# Patient Record
Sex: MALE | NOT HISPANIC OR LATINO | Employment: FULL TIME | URBAN - METROPOLITAN AREA
[De-identification: names, ages, dates, MRNs, and addresses within clinical notes are randomized per-mention and may not be internally consistent; named-entity substitution may affect disease eponyms.]

---

## 2018-04-09 ENCOUNTER — OFFICE VISIT (OUTPATIENT)
Dept: FAMILY MEDICINE CLINIC | Facility: CLINIC | Age: 63
End: 2018-04-09
Payer: COMMERCIAL

## 2018-04-09 VITALS
HEART RATE: 60 BPM | DIASTOLIC BLOOD PRESSURE: 82 MMHG | HEIGHT: 69 IN | WEIGHT: 183.8 LBS | BODY MASS INDEX: 27.22 KG/M2 | SYSTOLIC BLOOD PRESSURE: 160 MMHG | RESPIRATION RATE: 16 BRPM

## 2018-04-09 DIAGNOSIS — E78.1 HYPERTRIGLYCERIDEMIA: Chronic | ICD-10-CM

## 2018-04-09 DIAGNOSIS — I10 ESSENTIAL HYPERTENSION: Chronic | ICD-10-CM

## 2018-04-09 DIAGNOSIS — Z12.5 SCREENING FOR PROSTATE CANCER: ICD-10-CM

## 2018-04-09 DIAGNOSIS — Z00.00 WELLNESS EXAMINATION: Primary | ICD-10-CM

## 2018-04-09 DIAGNOSIS — Z13.1 SCREENING FOR DIABETES MELLITUS: ICD-10-CM

## 2018-04-09 DIAGNOSIS — F41.9 ANXIETY: Chronic | ICD-10-CM

## 2018-04-09 DIAGNOSIS — I34.1 MVP (MITRAL VALVE PROLAPSE): ICD-10-CM

## 2018-04-09 PROCEDURE — 99386 PREV VISIT NEW AGE 40-64: CPT | Performed by: FAMILY MEDICINE

## 2018-04-09 RX ORDER — LISINOPRIL 20 MG/1
20 TABLET ORAL DAILY
Qty: 90 TABLET | Refills: 1 | Status: SHIPPED | OUTPATIENT
Start: 2018-04-09 | End: 2018-04-17 | Stop reason: SDUPTHER

## 2018-04-09 RX ORDER — LISINOPRIL 10 MG/1
10 TABLET ORAL DAILY
COMMUNITY
End: 2018-04-09 | Stop reason: DRUGHIGH

## 2018-04-09 RX ORDER — FENOFIBRATE 134 MG/1
134 CAPSULE ORAL
Qty: 90 CAPSULE | Refills: 1 | Status: SHIPPED | OUTPATIENT
Start: 2018-04-09 | End: 2018-08-17 | Stop reason: SDUPTHER

## 2018-04-09 RX ORDER — CITALOPRAM 10 MG/1
10 TABLET ORAL DAILY
Qty: 90 TABLET | Refills: 1 | Status: SHIPPED | OUTPATIENT
Start: 2018-04-09 | End: 2018-09-12 | Stop reason: ALTCHOICE

## 2018-04-09 RX ORDER — CITALOPRAM 10 MG/1
10 TABLET ORAL DAILY
COMMUNITY
End: 2018-04-09 | Stop reason: SDUPTHER

## 2018-04-09 RX ORDER — FENOFIBRATE 134 MG/1
134 CAPSULE ORAL
COMMUNITY
End: 2018-04-09 | Stop reason: SDUPTHER

## 2018-04-09 NOTE — PROGRESS NOTES
Assessment/Plan:    No problem-specific Assessment & Plan notes found for this encounter  Diagnoses and all orders for this visit:    Wellness examination  Comments:  FBW ordered for the pt  Continue oral appliance at night  FBW ordered  He is to get back to regular exercise  Screening for diabetes mellitus  -     CBC and differential; Future  -     Lipid panel; Future  -     Comprehensive metabolic panel; Future    Screening for prostate cancer  Comments:  PSA included with FBW  Orders:  -     PSA, Total Screen    Essential hypertension  Comments:  Increasing Lisinopril to 20mg QD  Pt to continue a lower salt diet, and get back to regular exercise  Orders:  -     lisinopril (ZESTRIL) 20 mg tablet; Take 1 tablet (20 mg total) by mouth daily for 90 days    Hypertriglyceridemia  Comments: On Fenofibrate  Orders:  -     fenofibrate micronized (LOFIBRA) 134 MG capsule; Take 1 capsule (134 mg total) by mouth daily with breakfast    Anxiety  Comments:  Stable on Celexa  Orders:  -     citalopram (CeleXA) 10 mg tablet; Take 1 tablet (10 mg total) by mouth daily    MVP (mitral valve prolapse)  Comments:  Unclear hx of - will check echocardiagram   Orders:  -     Echo complete with contrast if indicated; Future    Other orders  -     Discontinue: citalopram (CeleXA) 10 mg tablet; Take 10 mg by mouth daily  -     Discontinue: lisinopril (ZESTRIL) 10 mg tablet; Take 10 mg by mouth daily  -     Discontinue: fenofibrate micronized (LOFIBRA) 134 MG capsule; Take 134 mg by mouth every morning before breakfast          Subjective:      Patient ID: Liliana Hillman is a 58 y o  male  Had colonoscopy x 2 (polyps) - will be following up with GI  Sees a dentist regularly  Does not exercise regularly  Has 1 step-daughter  He presents with his wife today - an RN with [employer]  Works in the [employer]  Unclear hx of possible MVP in the past   Takes his medication regularly, and limits salt intake    Does not regularly check his BPs at home  Sees a Dermatologist twice annually  Has an oral appliance that he wears at night for hx of mild-mod YUAN  May be getting a new one possibly  The following portions of the patient's history were reviewed and updated as appropriate: allergies, current medications, past family history, past social history, past surgical history and problem list     Past Medical History:   Diagnosis Date    Anxiety     Hypertension      Past Surgical History:   Procedure Laterality Date   [facility] 40       Current Outpatient Prescriptions:     citalopram (CeleXA) 10 mg tablet, Take 1 tablet (10 mg total) by mouth daily, Disp: 90 tablet, Rfl: 1    fenofibrate micronized (LOFIBRA) 134 MG capsule, Take 1 capsule (134 mg total) by mouth daily with breakfast, Disp: 90 capsule, Rfl: 1    lisinopril (ZESTRIL) 20 mg tablet, Take 1 tablet (20 mg total) by mouth daily for 90 days, Disp: 90 tablet, Rfl: 1    No Known Allergies    Family History   Problem Relation Age of Onset    Lung cancer Mother      No Known Allergies    Social History   Substance Use Topics    Smoking status: Never Smoker    Smokeless tobacco: Never Used    Alcohol use Yes      Comment: social         Review of Systems   Constitutional: Negative for activity change  Some head fullness - no dizziness; possibly assoc with his higher BPs  Respiratory: Negative for shortness of breath  Cardiovascular: Negative for chest pain  Gastrointestinal: Negative for abdominal pain and blood in stool  Genitourinary: Negative for difficulty urinating  No ED  Psychiatric/Behavioral: Negative for dysphoric mood  The patient is nervous/anxious  Chronic hx of anxiety - much better with treatment           Objective:      /82 (BP Location: Right arm, Patient Position: Sitting, Cuff Size: Standard)   Pulse 60   Resp 16   Ht 5' 9 09" (1 755 m)   Wt 83 4 kg (183 lb 12 8 oz)   BMI 27 07 kg/m²          Physical Exam   Constitutional: He is oriented to person, place, and time  He appears well-developed and well-nourished  No distress  HENT:   Head: Normocephalic and atraumatic  Right Ear: Hearing, tympanic membrane, external ear and ear canal normal    Left Ear: Hearing, tympanic membrane, external ear and ear canal normal    Mouth/Throat: Oropharynx is clear and moist  No oropharyngeal exudate  Eyes: Conjunctivae are normal    Neck: Normal range of motion  Neck supple  No thyromegaly present  Cardiovascular: Normal rate, regular rhythm, normal heart sounds and intact distal pulses  Exam reveals no gallop and no friction rub  No murmur heard  Pulmonary/Chest: Effort normal and breath sounds normal  No respiratory distress  He has no wheezes  He has no rales  Abdominal: Soft  Bowel sounds are normal  He exhibits no distension and no mass  There is no tenderness  There is no rebound and no guarding  Genitourinary: Prostate normal and penis normal  Rectal exam shows external hemorrhoid  Genitourinary Comments: 1 Non-thrombosed, non-irritated, external hemorrhoid  Musculoskeletal: He exhibits no edema  Lymphadenopathy:     He has no cervical adenopathy  Neurological: He is alert and oriented to person, place, and time  Skin: He is not diaphoretic  Psychiatric: He has a normal mood and affect  His behavior is normal  Judgment and thought content normal    Nursing note and vitals reviewed

## 2018-04-09 NOTE — PATIENT INSTRUCTIONS
Thank you for enrolling in [facility]  Please follow the instructions below to securely access your online medical record  Conjurt allows you to send messages to your doctor, view your test results, renew your prescriptions, schedule appointments, and more  [address] uses Single Sign on (SSO) Technology for you to log in and access our [facility], including [facility]  No more remembering multiple user names and passwords! We are going to guide you through, step by step, to help you set up your [employer] account which will provide access to your Con[employer]urt account  How Do I Sign Up? 1  In your Internet browser, go to [email or web address]       2  Click on the   [employer] patient account and then click Dont have an                 Account? Create one now      3  Enter your demographic information and chose a user name (email address) and password  Think of one that is secure and easy to remember  Enter a Referral code if you have one (this is not your "[employer]hart code ) Accept the Terms and Conditions and the Privacy Policy  4  Select your security questions that you will use to reset your password should you forget it  Click Submit  5  Enter your Conjurt Activation Code exactly as it appears below  You will not need to use this code after you have completed the sign-up process  If you do not sign up before the expiration date, you must request a new code  [facility] Activation Code: [email or web address]  Expires: 4/12/2018  6:23 PM    6  Confirm your email address  An email confirmation was sent to you  Please open that email and click Confirm your Email   You should then be redirected to our [employer] [facility] Single sign on page, where you will log on with the user name and password you created! Proceed to the [facility] Icon to view your personal health information          Additional Information  If you have questions, you can e-mail patient  [email or web address] or call [phone] to talk to our customer support staff  Remember, MyChart is NOT to be used for urgent needs  For medical emergencies, dial 911

## 2018-04-17 ENCOUNTER — TELEPHONE (OUTPATIENT)
Dept: FAMILY MEDICINE CLINIC | Facility: CLINIC | Age: 63
End: 2018-04-17

## 2018-04-17 DIAGNOSIS — F41.9 ANXIETY: Chronic | ICD-10-CM

## 2018-04-17 DIAGNOSIS — I10 ESSENTIAL HYPERTENSION: Primary | ICD-10-CM

## 2018-04-17 DIAGNOSIS — I10 ESSENTIAL HYPERTENSION: ICD-10-CM

## 2018-04-17 RX ORDER — CITALOPRAM 10 MG/1
10 TABLET ORAL DAILY
Qty: 90 TABLET | Refills: 0 | OUTPATIENT
Start: 2018-04-17

## 2018-04-17 RX ORDER — LISINOPRIL 40 MG/1
40 TABLET ORAL DAILY
Qty: 90 TABLET | Refills: 3 | Status: SHIPPED | OUTPATIENT
Start: 2018-04-17 | End: 2018-09-12 | Stop reason: ALTCHOICE

## 2018-04-17 RX ORDER — LISINOPRIL 40 MG/1
40 TABLET ORAL DAILY
Qty: 90 TABLET | Refills: 0 | OUTPATIENT
Start: 2018-04-17

## 2018-04-17 NOTE — TELEPHONE ENCOUNTER
I sent it to [facility]  We had him as taking 20 mg in the chart  I changed it   F/u with his PCP as scheduled

## 2018-04-30 LAB
ALBUMIN SERPL-MCNC: 4.6 G/DL (ref 3.6–4.8)
ALBUMIN/GLOB SERPL: 2.4 {RATIO} (ref 1.2–2.2)
ALP SERPL-CCNC: 55 IU/L (ref 39–117)
ALT SERPL-CCNC: 40 IU/L (ref 0–44)
AMBIG ABBREV DEFAULT: NORMAL
AMBIG ABBREV DEFAULT: NORMAL
AST SERPL-CCNC: 24 IU/L (ref 0–40)
BASOPHILS # BLD AUTO: 0 X10E3/UL (ref 0–0.2)
BASOPHILS NFR BLD AUTO: 1 %
BILIRUB SERPL-MCNC: 0.5 MG/DL (ref 0–1.2)
BUN SERPL-MCNC: 20 MG/DL (ref 8–27)
BUN/CREAT SERPL: 17 (ref 10–24)
CALCIUM SERPL-MCNC: 9.6 MG/DL (ref 8.6–10.2)
CHLORIDE SERPL-SCNC: 106 MMOL/L (ref 96–106)
CHOLEST SERPL-MCNC: 157 MG/DL (ref 100–199)
CO2 SERPL-SCNC: 24 MMOL/L (ref 18–29)
CREAT SERPL-MCNC: 1.19 MG/DL (ref 0.76–1.27)
EOSINOPHIL # BLD AUTO: 0.1 X10E3/UL (ref 0–0.4)
EOSINOPHIL NFR BLD AUTO: 3 %
ERYTHROCYTE [DISTWIDTH] IN BLOOD BY AUTOMATED COUNT: 13.5 % (ref 12.3–15.4)
GLOBULIN SER-MCNC: 1.9 G/DL (ref 1.5–4.5)
GLUCOSE SERPL-MCNC: 112 MG/DL (ref 65–99)
HCT VFR BLD AUTO: 43 % (ref 37.5–51)
HDLC SERPL-MCNC: 36 MG/DL
HGB BLD-MCNC: 14.1 G/DL (ref 13–17.7)
IMM GRANULOCYTES # BLD: 0 X10E3/UL (ref 0–0.1)
IMM GRANULOCYTES NFR BLD: 0 %
LDLC SERPL CALC-MCNC: 99 MG/DL (ref 0–99)
LYMPHOCYTES # BLD AUTO: 2 X10E3/UL (ref 0.7–3.1)
LYMPHOCYTES NFR BLD AUTO: 43 %
MCH RBC QN AUTO: 31.6 PG (ref 26.6–33)
MCHC RBC AUTO-ENTMCNC: 32.8 G/DL (ref 31.5–35.7)
MCV RBC AUTO: 96 FL (ref 79–97)
MONOCYTES # BLD AUTO: 0.3 X10E3/UL (ref 0.1–0.9)
MONOCYTES NFR BLD AUTO: 6 %
NEUTROPHILS # BLD AUTO: 2.2 X10E3/UL (ref 1.4–7)
NEUTROPHILS NFR BLD AUTO: 47 %
PLATELET # BLD AUTO: 222 X10E3/UL (ref 150–379)
POTASSIUM SERPL-SCNC: 5 MMOL/L (ref 3.5–5.2)
PROT SERPL-MCNC: 6.5 G/DL (ref 6–8.5)
PSA SERPL-MCNC: 0.2 NG/ML (ref 0–4)
RBC # BLD AUTO: 4.46 X10E6/UL (ref 4.14–5.8)
SL AMB EGFR AFRICAN AMERICAN: 75 ML/MIN/1.73
SL AMB EGFR NON AFRICAN AMERICAN: 65 ML/MIN/1.73
SL AMB VLDL CHOLESTEROL CALC: 22 MG/DL (ref 5–40)
SODIUM SERPL-SCNC: 145 MMOL/L (ref 134–144)
TRIGL SERPL-MCNC: 111 MG/DL (ref 0–149)
WBC # BLD AUTO: 4.7 X10E3/UL (ref 3.4–10.8)

## 2018-05-01 NOTE — PROGRESS NOTES
Please let the patient know that their bloodwork was generally normal - his cholesterol is well-controlled, blood counts normal, LFTs normal, PSA (prostate) normal at 0 2  His fasting glucose was a little high at 112 though (should be under 100)  Recommend working on a lower carb / starch diet, and getting regular exercise  He is to f/u as planned  Thanks     Dr Danielle Meyer

## 2018-05-10 ENCOUNTER — HOSPITAL ENCOUNTER (OUTPATIENT)
Dept: NON INVASIVE DIAGNOSTICS | Facility: CLINIC | Age: 63
Discharge: HOME | End: 2018-05-10
Payer: COMMERCIAL

## 2018-05-10 DIAGNOSIS — I34.1 MVP (MITRAL VALVE PROLAPSE): ICD-10-CM

## 2018-05-10 PROCEDURE — 93306 TTE W/DOPPLER COMPLETE: CPT

## 2018-05-11 PROCEDURE — 93306 TTE W/DOPPLER COMPLETE: CPT | Performed by: INTERNAL MEDICINE

## 2018-05-11 NOTE — PROGRESS NOTES
Please let the patient know that their echocardiogram was some reassuring - some trace leakiness at valves, but this is not an uncommon finding on this study  Overall heart function appears normal, and there is no clear sign of mitral valve prolapse  Thanks  Annabella

## 2018-07-23 ENCOUNTER — OFFICE VISIT (OUTPATIENT)
Dept: FAMILY MEDICINE CLINIC | Facility: CLINIC | Age: 63
End: 2018-07-23
Payer: COMMERCIAL

## 2018-07-23 VITALS
SYSTOLIC BLOOD PRESSURE: 122 MMHG | DIASTOLIC BLOOD PRESSURE: 76 MMHG | BODY MASS INDEX: 26.42 KG/M2 | WEIGHT: 179.4 LBS | RESPIRATION RATE: 16 BRPM | HEART RATE: 80 BPM

## 2018-07-23 DIAGNOSIS — I10 ESSENTIAL HYPERTENSION: Chronic | ICD-10-CM

## 2018-07-23 DIAGNOSIS — R73.01 IMPAIRED FASTING GLUCOSE: ICD-10-CM

## 2018-07-23 DIAGNOSIS — E78.1 HYPERTRIGLYCERIDEMIA: Primary | Chronic | ICD-10-CM

## 2018-07-23 DIAGNOSIS — F41.9 ANXIETY: Chronic | ICD-10-CM

## 2018-07-23 PROCEDURE — 3074F SYST BP LT 130 MM HG: CPT | Performed by: FAMILY MEDICINE

## 2018-07-23 PROCEDURE — 3078F DIAST BP <80 MM HG: CPT | Performed by: FAMILY MEDICINE

## 2018-07-23 PROCEDURE — 99213 OFFICE O/P EST LOW 20 MIN: CPT | Performed by: FAMILY MEDICINE

## 2018-07-23 NOTE — PROGRESS NOTES
Assessment/Plan:    No problem-specific Assessment & Plan notes found for this encounter  Diagnoses and all orders for this visit:    Hypertriglyceridemia  Comments: Well controlled with Fenofibrate  He is to continue a lower carb diet, and try to get regular exercise  Essential hypertension  Comments:  Controlled well with Lisinopril 20 MG (reviewed) daily  Impaired fasting glucose  Comments:  Diet and exercise as above  We will review labs when available from 11/2018  Anxiety  Comments:  Controlled on present management of Celexa 10mg QD  Subjective:      Patient ID: Barbie Mosqueda is a 58 y o  male  Pt presents with his wife today in f/u  Echo reviewed - no signs of MVP, some mild regurg at valves but no stenosis, normal LVF, EF 60%, no LVH  Labs reassuring - PSA 0 2, Trig 111, HDL 37, LDL 99, CBC nml, Cr/LFTs nml  Glucose noted though at 112  Generally feeling well  He will be having labs done for insurance with work in 11/2018 - we discussed, and suspect that there will be an A1c with these labs  The following portions of the patient's history were reviewed and updated as appropriate: allergies, current medications, past family history, past medical history, past surgical history and problem list     Review of Systems   Constitutional: Negative for activity change  Respiratory: Negative for shortness of breath  Cardiovascular: Negative for chest pain  Musculoskeletal: Negative for myalgias  Psychiatric/Behavioral:        Mood is stable on present therapy by pt report  Objective:      /76 (BP Location: Left arm, Patient Position: Sitting, Cuff Size: Standard)   Pulse 80   Resp 16   Wt 81 4 kg (179 lb 6 4 oz)   BMI 26 42 kg/m²          Physical Exam   Constitutional: He is oriented to person, place, and time  He appears well-developed and well-nourished  No distress  HENT:   Head: Normocephalic and atraumatic     Eyes: Conjunctivae are normal  Neck: Normal range of motion  Neck supple  No thyromegaly present  Cardiovascular: Normal rate, regular rhythm and normal heart sounds  Exam reveals no gallop and no friction rub  No murmur heard  Pulmonary/Chest: Effort normal and breath sounds normal  No respiratory distress  He has no wheezes  He has no rales  Lymphadenopathy:     He has no cervical adenopathy  Neurological: He is alert and oriented to person, place, and time  Skin: He is not diaphoretic  Psychiatric: He has a normal mood and affect  His behavior is normal  Judgment and thought content normal    Nursing note and vitals reviewed

## 2018-08-17 DIAGNOSIS — E78.1 HYPERTRIGLYCERIDEMIA: Chronic | ICD-10-CM

## 2018-08-17 RX ORDER — FENOFIBRATE 134 MG/1
CAPSULE ORAL
Qty: 90 CAPSULE | Refills: 1 | Status: SHIPPED | OUTPATIENT
Start: 2018-08-17 | End: 2019-03-03 | Stop reason: SDUPTHER

## 2018-09-12 ENCOUNTER — OFFICE VISIT (OUTPATIENT)
Dept: FAMILY MEDICINE CLINIC | Facility: CLINIC | Age: 63
End: 2018-09-12
Payer: COMMERCIAL

## 2018-09-12 VITALS
OXYGEN SATURATION: 97 % | HEART RATE: 58 BPM | DIASTOLIC BLOOD PRESSURE: 82 MMHG | WEIGHT: 183.4 LBS | SYSTOLIC BLOOD PRESSURE: 124 MMHG | BODY MASS INDEX: 27.01 KG/M2

## 2018-09-12 DIAGNOSIS — H91.93 BILATERAL HEARING LOSS, UNSPECIFIED HEARING LOSS TYPE: ICD-10-CM

## 2018-09-12 DIAGNOSIS — K62.5 RECTAL BLEEDING: Primary | ICD-10-CM

## 2018-09-12 PROCEDURE — 99213 OFFICE O/P EST LOW 20 MIN: CPT | Performed by: FAMILY MEDICINE

## 2018-09-12 RX ORDER — CITALOPRAM 40 MG/1
40 TABLET ORAL DAILY
COMMUNITY
End: 2019-01-16 | Stop reason: SDUPTHER

## 2018-09-12 RX ORDER — LISINOPRIL 20 MG/1
20 TABLET ORAL DAILY
COMMUNITY
End: 2019-03-14 | Stop reason: ALTCHOICE

## 2018-09-12 NOTE — PROGRESS NOTES
Assessment/Plan:    No problem-specific Assessment & Plan notes found for this encounter  Diagnoses and all orders for this visit:    Rectal bleeding  Comments:  Suspect here bleeding due to internal hemorrhoid, etc  I did agree with pt seeing GI - he is due at this time for colon cancer screening  Precautions given  Bilateral hearing loss, unspecified hearing loss type  Comments:  Pt's wife reports that Diane Pretty may be showing some signs of hearing difficulties - Consult placed to Audiology  Orders:  -     Ambulatory referral to Audiology; Future    Other orders  -     lisinopril (ZESTRIL) 20 mg tablet; Take 20 mg by mouth daily  -     citalopram (CeleXA) 40 mg tablet; Take 40 mg by mouth daily          Subjective:      Patient ID: Rashida Moran is a 58 y o  male  Diane Pretty presents with his wife today  Had some recurrent pain in the RLQ - last was approx 2 weeks ago; resolved  No diarrhea or constipation  Had some streaking of bright blood on the toilet tissue around that time as well  Also resolved  A darker, but NON-melanotic, stool today  He has already made an appt with [facility], and sees next week  He is due for his next colonoscopy  Of Note:  Pt declined today NICK/FOBT testing in clinic      The following portions of the patient's history were reviewed and updated as appropriate: allergies, current medications, past family history, past social history, past surgical history and problem list     Past Medical History:   Diagnosis Date    Anxiety     Hypertension        Current Outpatient Prescriptions:     citalopram (CeleXA) 40 mg tablet, Take 40 mg by mouth daily, Disp: , Rfl:     lisinopril (ZESTRIL) 20 mg tablet, Take 20 mg by mouth daily, Disp: , Rfl:     fenofibrate micronized (LOFIBRA) 134 MG capsule, TAKE 1 CAPSULE BY MOUTH  DAILY WITH BREAKFAST, Disp: 90 capsule, Rfl: 1    No Known Allergies    Social History   Substance Use Topics    Smoking status: Never Smoker    Smokeless tobacco: Never Used    Alcohol use Yes      Comment: social         Review of Systems   Constitutional: Negative for activity change and fever  HENT: Positive for hearing loss  Respiratory: Negative for shortness of breath  Cardiovascular: Negative for chest pain  Gastrointestinal: Negative for abdominal pain, blood in stool, constipation and diarrhea  Neurological: Negative for dizziness  Objective:      /82 (BP Location: Right arm, Patient Position: Sitting, Cuff Size: Standard)   Pulse 58   Wt 83 2 kg (183 lb 6 4 oz)   SpO2 97%   BMI 27 01 kg/m²          Physical Exam   Constitutional: He is oriented to person, place, and time  He appears well-developed and well-nourished  No distress  HENT:   Head: Normocephalic and atraumatic  Eyes: Conjunctivae are normal    Neck: Normal range of motion  Neck supple  No thyromegaly present  Cardiovascular: Normal rate, regular rhythm and normal heart sounds  Exam reveals no gallop and no friction rub  No murmur heard  Pulmonary/Chest: Effort normal and breath sounds normal  No respiratory distress  He has no wheezes  He has no rales  Abdominal: Soft  Bowel sounds are normal  He exhibits no distension and no mass  There is no tenderness  There is no rebound and no guarding  Lymphadenopathy:     He has no cervical adenopathy  Neurological: He is alert and oriented to person, place, and time  Skin: He is not diaphoretic  Psychiatric: His speech is normal and behavior is normal  Judgment and thought content normal  His mood appears anxious  Nursing note and vitals reviewed

## 2018-11-02 ENCOUNTER — OFFICE VISIT (OUTPATIENT)
Dept: FAMILY MEDICINE CLINIC | Facility: CLINIC | Age: 63
End: 2018-11-02
Payer: COMMERCIAL

## 2018-11-02 VITALS
TEMPERATURE: 97.2 F | RESPIRATION RATE: 16 BRPM | DIASTOLIC BLOOD PRESSURE: 80 MMHG | HEIGHT: 69 IN | SYSTOLIC BLOOD PRESSURE: 122 MMHG | HEART RATE: 54 BPM | OXYGEN SATURATION: 96 % | WEIGHT: 181.6 LBS | BODY MASS INDEX: 26.9 KG/M2

## 2018-11-02 DIAGNOSIS — A08.4 VIRAL GASTROENTERITIS: Primary | ICD-10-CM

## 2018-11-02 PROCEDURE — 3008F BODY MASS INDEX DOCD: CPT | Performed by: FAMILY MEDICINE

## 2018-11-02 PROCEDURE — 99213 OFFICE O/P EST LOW 20 MIN: CPT | Performed by: FAMILY MEDICINE

## 2018-11-02 NOTE — PROGRESS NOTES
Assessment/Plan:    No problem-specific Assessment & Plan notes found for this encounter  Diagnoses and all orders for this visit:    Viral gastroenteritis  Comments:  Mild VGE/pt stable on exam   Pt to follow a bland diet for a few days, & advance slowly; hydrate well  OTC Zantac x 3 - 4 days, Imodium PRN  Precautions  Subjective:      Patient ID: Mckay New is a 58 y o  male  Viviana Organ had issues with gas in the epigastrium today, followed later by nausea and loose stools  No fevers  No blood in the stool  The following portions of the patient's history were reviewed and updated as appropriate: allergies, current medications, past family history, past social history, past surgical history and problem list     Past Medical History:   Diagnosis Date    Anxiety     Hypertension      Past Surgical History:   Procedure Laterality Date   [address]       Current Outpatient Prescriptions:     citalopram (CeleXA) 40 mg tablet, Take 40 mg by mouth daily, Disp: , Rfl:     fenofibrate micronized (LOFIBRA) 134 MG capsule, TAKE 1 CAPSULE BY MOUTH  DAILY WITH BREAKFAST, Disp: 90 capsule, Rfl: 1    lisinopril (ZESTRIL) 20 mg tablet, Take 20 mg by mouth daily, Disp: , Rfl:     No Known Allergies    Review of Systems   Constitutional: Positive for fatigue  Negative for activity change and fever  Respiratory: Negative for shortness of breath  Cardiovascular: Negative for chest pain  Gastrointestinal: Positive for diarrhea and nausea  +Gassy  Objective:      /80 (BP Location: Left arm, Patient Position: Sitting, Cuff Size: Standard)   Pulse (!) 54   Temp (!) 97 2 °F (36 2 °C) (Tympanic)   Resp 16   Ht 5' 9" (1 753 m)   Wt 82 4 kg (181 lb 9 6 oz)   SpO2 96%   BMI 26 82 kg/m²          Physical Exam   Constitutional: He is oriented to person, place, and time  He appears well-developed and well-nourished   No distress  Pierre Patel is tired today, but non-toxic appearing  HENT:   Head: Normocephalic and atraumatic  Right Ear: Hearing, tympanic membrane, external ear and ear canal normal    Left Ear: Hearing, tympanic membrane, external ear and ear canal normal    Mouth/Throat: Oropharynx is clear and moist  No oropharyngeal exudate  Eyes: Conjunctivae are normal    Neck: Normal range of motion  Neck supple  No thyromegaly present  Cardiovascular: Normal rate, regular rhythm and normal heart sounds  Exam reveals no gallop and no friction rub  No murmur heard  Pulmonary/Chest: Effort normal and breath sounds normal  No respiratory distress  He has no wheezes  He has no rales  Abdominal: Soft  Bowel sounds are normal  He exhibits no ascites and no mass  There is no hepatosplenomegaly  There is tenderness in the epigastric area  There is no rigidity, no rebound, no guarding, no tenderness at McBurney's point and negative Enamorado's sign  Lymphadenopathy:     He has no cervical adenopathy  Neurological: He is alert and oriented to person, place, and time  Skin: He is not diaphoretic  Psychiatric: He has a normal mood and affect  His behavior is normal  Judgment and thought content normal    Nursing note and vitals reviewed

## 2018-11-19 ENCOUNTER — ANESTHESIA EVENT (OUTPATIENT)
Dept: PERIOP | Facility: HOSPITAL | Age: 63
End: 2018-11-19
Payer: COMMERCIAL

## 2018-11-21 ENCOUNTER — ANESTHESIA (OUTPATIENT)
Dept: PERIOP | Facility: HOSPITAL | Age: 63
End: 2018-11-21
Payer: COMMERCIAL

## 2018-11-21 ENCOUNTER — HOSPITAL ENCOUNTER (OUTPATIENT)
Facility: HOSPITAL | Age: 63
Setting detail: OUTPATIENT SURGERY
Discharge: HOME | End: 2018-11-21
Attending: INTERNAL MEDICINE | Admitting: INTERNAL MEDICINE
Payer: COMMERCIAL

## 2018-11-21 VITALS
OXYGEN SATURATION: 96 % | HEIGHT: 69 IN | TEMPERATURE: 97.9 F | WEIGHT: 180.2 LBS | SYSTOLIC BLOOD PRESSURE: 153 MMHG | HEART RATE: 55 BPM | BODY MASS INDEX: 26.69 KG/M2 | DIASTOLIC BLOOD PRESSURE: 94 MMHG | RESPIRATION RATE: 16 BRPM

## 2018-11-21 DIAGNOSIS — Z12.11 ENCOUNTER FOR SCREENING FOR MALIGNANT NEOPLASM OF COLON: ICD-10-CM

## 2018-11-21 PROCEDURE — 88305 TISSUE EXAM BY PATHOLOGIST: CPT | Performed by: PATHOLOGY

## 2018-11-21 RX ORDER — PROPOFOL 10 MG/ML
INJECTION, EMULSION INTRAVENOUS AS NEEDED
Status: DISCONTINUED | OUTPATIENT
Start: 2018-11-21 | End: 2018-11-21 | Stop reason: SURG

## 2018-11-21 RX ORDER — HYDRALAZINE HYDROCHLORIDE 20 MG/ML
5 INJECTION INTRAMUSCULAR; INTRAVENOUS AS NEEDED
Status: DISCONTINUED | OUTPATIENT
Start: 2018-11-21 | End: 2018-11-21 | Stop reason: HOSPADM

## 2018-11-21 RX ORDER — METOCLOPRAMIDE HYDROCHLORIDE 5 MG/ML
10 INJECTION INTRAMUSCULAR; INTRAVENOUS ONCE AS NEEDED
Status: DISCONTINUED | OUTPATIENT
Start: 2018-11-21 | End: 2018-11-21 | Stop reason: HOSPADM

## 2018-11-21 RX ORDER — FENTANYL CITRATE/PF 50 MCG/ML
25 SYRINGE (ML) INJECTION
Status: DISCONTINUED | OUTPATIENT
Start: 2018-11-21 | End: 2018-11-21 | Stop reason: HOSPADM

## 2018-11-21 RX ORDER — PROMETHAZINE HYDROCHLORIDE 25 MG/ML
6.25 INJECTION, SOLUTION INTRAMUSCULAR; INTRAVENOUS ONCE AS NEEDED
Status: DISCONTINUED | OUTPATIENT
Start: 2018-11-21 | End: 2018-11-21 | Stop reason: HOSPADM

## 2018-11-21 RX ORDER — ONDANSETRON 2 MG/ML
4 INJECTION INTRAMUSCULAR; INTRAVENOUS ONCE AS NEEDED
Status: DISCONTINUED | OUTPATIENT
Start: 2018-11-21 | End: 2018-11-21 | Stop reason: HOSPADM

## 2018-11-21 RX ORDER — SODIUM CHLORIDE 9 MG/ML
20 INJECTION, SOLUTION INTRAVENOUS CONTINUOUS
Status: DISCONTINUED | OUTPATIENT
Start: 2018-11-21 | End: 2018-11-21 | Stop reason: HOSPADM

## 2018-11-21 RX ORDER — MEPERIDINE HYDROCHLORIDE 50 MG/ML
12.5 INJECTION INTRAMUSCULAR; INTRAVENOUS; SUBCUTANEOUS
Status: DISCONTINUED | OUTPATIENT
Start: 2018-11-21 | End: 2018-11-21 | Stop reason: HOSPADM

## 2018-11-21 RX ORDER — LABETALOL HYDROCHLORIDE 5 MG/ML
5 INJECTION, SOLUTION INTRAVENOUS AS NEEDED
Status: DISCONTINUED | OUTPATIENT
Start: 2018-11-21 | End: 2018-11-21 | Stop reason: HOSPADM

## 2018-11-21 RX ADMIN — PROPOFOL 30 MG: 10 INJECTION, EMULSION INTRAVENOUS at 08:49

## 2018-11-21 RX ADMIN — PROPOFOL 20 MG: 10 INJECTION, EMULSION INTRAVENOUS at 08:51

## 2018-11-21 RX ADMIN — SODIUM CHLORIDE 20 ML/HR: 0.9 INJECTION, SOLUTION INTRAVENOUS at 07:54

## 2018-11-21 RX ADMIN — PROPOFOL 20 MG: 10 INJECTION, EMULSION INTRAVENOUS at 08:38

## 2018-11-21 RX ADMIN — PROPOFOL 50 MG: 10 INJECTION, EMULSION INTRAVENOUS at 08:44

## 2018-11-21 RX ADMIN — PROPOFOL 20 MG: 10 INJECTION, EMULSION INTRAVENOUS at 08:40

## 2018-11-21 RX ADMIN — PROPOFOL 30 MG: 10 INJECTION, EMULSION INTRAVENOUS at 08:34

## 2018-11-21 RX ADMIN — PROPOFOL 30 MG: 10 INJECTION, EMULSION INTRAVENOUS at 08:52

## 2018-11-21 RX ADMIN — PROPOFOL 20 MG: 10 INJECTION, EMULSION INTRAVENOUS at 08:33

## 2018-11-21 RX ADMIN — PROPOFOL 100 MG: 10 INJECTION, EMULSION INTRAVENOUS at 08:31

## 2018-11-21 RX ADMIN — PROPOFOL 50 MG: 10 INJECTION, EMULSION INTRAVENOUS at 08:41

## 2018-11-21 RX ADMIN — SODIUM CHLORIDE: 0.9 INJECTION, SOLUTION INTRAVENOUS at 08:00

## 2018-11-21 RX ADMIN — PROPOFOL 30 MG: 10 INJECTION, EMULSION INTRAVENOUS at 08:46

## 2018-11-21 RX ADMIN — PROPOFOL 30 MG: 10 INJECTION, EMULSION INTRAVENOUS at 08:35

## 2018-11-21 NOTE — ANESTHESIA POSTPROCEDURE EVALUATION
Post-Op Assessment Note      CV Status:  Stable    Mental Status:  Awake    Hydration Status:  Stable    PONV Controlled:  None    Airway Patency:  Patent and adequate    Post Op Vitals Reviewed: Yes          Staff: Anesthesiologist           BP      Temp      Pulse    Resp      SpO2

## 2018-11-21 NOTE — DISCHARGE INSTRUCTIONS
Jann Lundborg      Dear Patient,    If specimens were collected, the office will call you with the pathology results within 2 weeks  It is very important that you follow these instructions:    Because you received intravenous sedation for your exam, you must return home and   · REST TODAY - DO NOT STAY ALONE  · DO NOT operate machinery for 24 hours   · DO NOT drink alcohol for 24 hours  · DO NOT drive for 24 hours  · DO NOT return to work until tomorrow   · DO NOT sign important paperwork for 24 hours    ·  If the site where your IV was placed is painful, place warm wet  compresses on the site until the soreness is relieved  Call us if there  is no improvement within 24 hours  · Unless otherwise instructed, you may resume your regular diet,  Start by taking  crackers and clear liquids  If no nausea or vomiting, you may advance your diet as tolerated  · Call your physician at [phone] if you develop any of the following symptoms:  · NEW OR INCREASED BLEEDING  · NEW ABDOMINAL DISTENTION (SWELLING)  · INCREASING NAUSEA, VOMITING OR PAIN  · FEVER/CHILLS  · BLACK/BLOODY STOOLS    If you are unable to contact the the doctor or your primary physician and you are having a severe complication, go to the nearest emergency room or call 911  Call [facility] at [phone] if you have any problems or questions related to your procedure  NOTE: After normal office hours (Monday-Friday 9:00 a m  to 4:00 p m ), the answering service will answer the phone, take a message, and they will contact the on call physician to call you

## 2018-11-21 NOTE — OP NOTE
Colonoscopy Procedure Note    Procedure: Colonoscopy    Sedation: Monitored anesthesia care, check anesthesia records    II      ASA Class: II     INDICATIONS:  Screening    POST-OP DIAGNOSIS: See the impression below    Procedure Details     Prior colonoscopy: 10 years ago  Informed consent was obtained for the procedure, including sedation  Risks of perforation, hemorrhage, adverse drug reaction and aspiration were discussed  The patient was placed in the left lateral decubitus position  Based on the pre-procedure assessment, including review of the patient's medical history, medications, allergies, and review of systems, he had been deemed to be an appropriate candidate for conscious sedation; he was therefore sedated with the medications listed below  The patient was monitored continuously with telemetry, pulse oximetry, blood pressure monitoring, and direct observations  A rectal examination was performed  The colonoscope was inserted into the rectum and advanced under direct vision to the cecum, which was identified by the ileocecal valve and appendiceal orifice  The quality of the colonic preparation was good  A careful inspection was made as the colonoscope was withdrawn, including a retroflexed view of the rectum; findings and interventions are described below  Findings:  Small sessile polyps 4-5 mm 1 in ascending colon the other distal sigmoid both removed by cold biopsy forceps  Good hemostasis post polypectomy  Internal and external hemorrhoids  Estimated Blood Loss: None    Specimens: Pathology        Complications: None; patient tolerated the procedure well  Impression:    Small sessile polyps  Internal and external hemorrhoids    Recommendations:  Resume usual diet and medications  Repeat colonoscopy in 5 years if polyp is adenomatous, 10 years if hyperplastic or normal tissue

## 2018-11-21 NOTE — ANESTHESIA PREPROCEDURE EVALUATION
Review of Systems/Medical History  Patient summary reviewed  Chart reviewed      Cardiovascular  Exercise tolerance (METS): >4,  Hyperlipidemia, Hypertension ,    Pulmonary  Negative pulmonary ROS        GI/Hepatic  Negative GI/hepatic ROS   Bowel prep       Negative  ROS        Endo/Other  Negative endo/other ROS      GYN  Negative gynecology ROS          Hematology  Negative hematology ROS      Musculoskeletal  Negative musculoskeletal ROS        Neurology  Negative neurology ROS      Psychology   Anxiety,              Physical Exam    Airway    Mallampati score: II  TM Distance: >3 FB  Neck ROM: full     Dental   Comment: Partial upper dentures, No notable dental hx     Cardiovascular  Rhythm: regular, Rate: normal, Cardiovascular exam normal    Pulmonary  Pulmonary exam normal Breath sounds clear to auscultation,     Other Findings        Anesthesia Plan  ASA Score- 2     Anesthesia Type- IV sedation with anesthesia with ASA Monitors  Additional Monitors:   Airway Plan:         Plan Factors-    Induction-     Postoperative Plan-     Informed Consent- Anesthetic plan and risks discussed with patient

## 2019-01-07 ENCOUNTER — TELEPHONE (OUTPATIENT)
Dept: FAMILY MEDICINE CLINIC | Facility: CLINIC | Age: 64
End: 2019-01-07

## 2019-01-07 NOTE — TELEPHONE ENCOUNTER
PT WIFE CALLED AND PT HAD COLONOSCOPY DONE ON 11/21/2018  I SEE RESULTS IN THERE BUT NOT SIGNED OFF BY YOU  WHEN YOU CAN COULD YOU LET ME KNOW THE RESULTS   Juaquin Jaimes

## 2019-01-07 NOTE — TELEPHONE ENCOUNTER
His biopsy was negative for cancer (sessile polyp on colonoscopy)  GI should have contacted him with results (generally, we do not, because we did not do the procedure)  Thanks    Annabella

## 2019-01-16 DIAGNOSIS — F41.9 ANXIETY: Primary | ICD-10-CM

## 2019-01-16 RX ORDER — CITALOPRAM 40 MG/1
40 TABLET ORAL DAILY
Qty: 90 TABLET | Refills: 3 | Status: SHIPPED | OUTPATIENT
Start: 2019-01-16 | End: 2019-01-29 | Stop reason: SDUPTHER

## 2019-01-16 NOTE — TELEPHONE ENCOUNTER
citalopram (CeleXA) 40 mg tablet  Sig: Take 40 mg by mouth daily    LAST OV 11/02/2018    PATIENT HAS 6 TABS LEFT        PHARMACY- [facility]

## 2019-01-29 DIAGNOSIS — F41.9 ANXIETY: Primary | ICD-10-CM

## 2019-01-29 RX ORDER — CITALOPRAM 40 MG/1
40 TABLET ORAL DAILY
Qty: 90 TABLET | Refills: 3 | Status: SHIPPED | OUTPATIENT
Start: 2019-01-29 | End: 2019-04-29

## 2019-01-29 NOTE — TELEPHONE ENCOUNTER
WE CALLED [facility] FOR HIS CITALOPRAM HOWEVER THEY DONT KNOW HOW LONG IT WILL TAKE TO GET TO HIM  PT IS REQUESTING IF YOU CAN DO 30 DAYS OF CITALORPRAM 40 MG 1X DAILY TO [facility] ON [address] PLS   ADVISE

## 2019-02-25 ENCOUNTER — OFFICE VISIT (OUTPATIENT)
Dept: FAMILY MEDICINE CLINIC | Facility: CLINIC | Age: 64
End: 2019-02-25
Payer: COMMERCIAL

## 2019-02-25 VITALS
HEART RATE: 55 BPM | DIASTOLIC BLOOD PRESSURE: 92 MMHG | OXYGEN SATURATION: 97 % | WEIGHT: 185.8 LBS | SYSTOLIC BLOOD PRESSURE: 164 MMHG | BODY MASS INDEX: 27.44 KG/M2

## 2019-02-25 DIAGNOSIS — R07.89 ATYPICAL CHEST PAIN: ICD-10-CM

## 2019-02-25 DIAGNOSIS — R42 DIZZINESS: ICD-10-CM

## 2019-02-25 DIAGNOSIS — F41.9 ANXIETY: Chronic | ICD-10-CM

## 2019-02-25 DIAGNOSIS — R00.2 PALPITATIONS: Primary | ICD-10-CM

## 2019-02-25 DIAGNOSIS — R73.01 IMPAIRED FASTING GLUCOSE: Chronic | ICD-10-CM

## 2019-02-25 DIAGNOSIS — E78.1 HYPERTRIGLYCERIDEMIA: Chronic | ICD-10-CM

## 2019-02-25 DIAGNOSIS — I10 ESSENTIAL HYPERTENSION: Chronic | ICD-10-CM

## 2019-02-25 DIAGNOSIS — R14.2 BELCHING: Chronic | ICD-10-CM

## 2019-02-25 DIAGNOSIS — R53.83 FATIGUE, UNSPECIFIED TYPE: ICD-10-CM

## 2019-02-25 DIAGNOSIS — R19.7 DIARRHEA, UNSPECIFIED TYPE: ICD-10-CM

## 2019-02-25 DIAGNOSIS — G47.33 OSA (OBSTRUCTIVE SLEEP APNEA): ICD-10-CM

## 2019-02-25 PROCEDURE — 99214 OFFICE O/P EST MOD 30 MIN: CPT | Performed by: FAMILY MEDICINE

## 2019-02-25 PROCEDURE — 93000 ELECTROCARDIOGRAM COMPLETE: CPT | Performed by: FAMILY MEDICINE

## 2019-02-25 NOTE — PROGRESS NOTES
Assessment/Plan:    Palpitations  Suspecting more anxiety - related  Pt is to continue to limit caffeine intake  Checking TSH with other labs (FBW)  Holter Monitor and Stress Echo ordered  Pt is to continue to hydrate well / consistently  Pt is to f/u in 3 - 4 weeks, or sooner PRN  Atypical chest pain  Again, Deonna Hyde is very stable on exam   He is to f/u in 3 - 4 weeks, or sooner PRN  Suspect here non-cardiac source for his palpitations / ?chest discomfort? (no reported chest pain) -> likely some GERD (his ongoing belching) v anxiety, or both  ECG today was overall reassuring  Pt can use OTC Zantac 150mg PO BID PRN, and also disccused continuing low intake of caffeinated products  Holter Monitor and Stress Echo, along with FBW ordered  Discussed his echo results from last year (no MVP; EF of 60%)  Diagnoses and all orders for this visit:    Palpitations  Comments:  As above  Orders:  -     POCT ECG  -     Holter monitor - 48 hour; Future  -     Echo stress test w contrast if indicated; Future  -     TSH, 3rd generation with Free T4 reflex; Future    Atypical chest pain  Comments:  As above  Orders:  -     POCT ECG  -     Holter monitor - 48 hour; Future  -     Echo stress test w contrast if indicated; Future    Dizziness  Comments:  As above  Pt is to continue to hydrate well  Fatigue, unspecified type  Comments:  Suspect related to his YUAN - referring pt back to Sleep Medicine  May possibly need CPAP now  Orders:  -     CBC and differential; Future  -     TSH, 3rd generation with Free T4 reflex; Future  -     Ambulatory referral to Sleep Medicine; Future    YUAN (obstructive sleep apnea)  -     Ambulatory referral to Sleep Medicine; Future    Essential hypertension  Comments:  BP not fully controlled -> pt reports normal BPs at home  Keep med same for now; he is to focus on less salt in his diet  Reassess at f/u      Impaired fasting glucose  Comments:  A1c incl with FBW ordered  Orders:  -     Comprehensive metabolic panel; Future  -     Lipid Panel with Direct LDL reflex; Future  -     HEMOGLOBIN A1C W/ EAG ESTIMATION; Future    Hypertriglyceridemia  Comments:  FBW ordered  On Fenofibrate  Orders:  -     Comprehensive metabolic panel; Future  -     Lipid Panel with Direct LDL reflex; Future  -     HEMOGLOBIN A1C W/ EAG ESTIMATION; Future    Anxiety  Comments:  Pt on Celexa  I do believe that this may be related to some of the palpitations / etc, that the pt has been having  Diarrhea, unspecified type  Comments:  Likely mild VGE; resolving  Pt to keep diet bland for a couple days, advance slowly; continue to hydrate well  Belching  Comments:  Possibly GERD related -> pt can continue Zantac BID PRN  Urged pt to reach out to his GI specialists again ([facility]); may need possible EGD  Subjective:      Patient ID: Larisa Ayala is a 61 y o  male  Gely Pyle presents today with his wife with multiple complaints  Having palpitations almost daily - Only notices increased HR / palpitations upon standing rapidly  Occasionally dizzy with this  States that he does hydrate well regularly  No clear CP/SOB  He has concerns - +FamHx of CAD (Brother)  Pt is a non-smoker  He is trying to hydrate well, and limiting caffeine intake  He has had ongoing "belching" now for approx 6 months  No clear heartburn  Pt had a quick turn with a diarrheal illness a couple days ago - this is fortunately resolving  Also issues with persistent fatigue  Pt uses an oral device at night for a hx of YUAN - still having issues with being tired though          The following portions of the patient's history were reviewed and updated as appropriate: allergies, current medications, past family history, past social history, past surgical history and problem list     Past Medical History:   Diagnosis Date    Anxiety     Hypertension        Current Outpatient Medications:     citalopram ([place]) 40 mg tablet, Take 1 tablet (40 mg total) by mouth daily for 90 days, Disp: 90 tablet, Rfl: 3    fenofibrate micronized (LOFIBRA) 134 MG capsule, TAKE 1 CAPSULE BY MOUTH  DAILY WITH BREAKFAST, Disp: 90 capsule, Rfl: 1    lisinopril (ZESTRIL) 20 mg tablet, Take 20 mg by mouth daily, Disp: , Rfl:     No Known Allergies    Family History   Problem Relation Age of Onset    Lung cancer Mother     Diabetes Brother     Diabetes Brother      Social History     Tobacco Use    Smoking status: Never Smoker    Smokeless tobacco: Never Used   Substance Use Topics    Alcohol use: Yes     Comment: 2 x monthly    Drug use: No         Review of Systems   Constitutional: Positive for fatigue  Negative for activity change and fever  Respiratory: Negative for shortness of breath  Cardiovascular: Positive for palpitations  Negative for chest pain  Gastrointestinal: Negative for blood in stool and diarrhea  No heartburn; +belching -> has had for months  Recent diarrhea - resolving  Neurological: Positive for dizziness  Objective:      /92 (BP Location: Left arm, Patient Position: Sitting, Cuff Size: Standard)   Pulse 55   Wt 84 3 kg (185 lb 12 8 oz)   SpO2 97%   BMI 27 44 kg/m²   Repeat BP = 148/88 RA seated  Physical Exam   Constitutional: He is oriented to person, place, and time  He appears well-developed and well-nourished  No distress  HENT:   Head: Normocephalic and atraumatic  Right Ear: Hearing, tympanic membrane, external ear and ear canal normal    Left Ear: Hearing, tympanic membrane, external ear and ear canal normal    Mouth/Throat: Oropharynx is clear and moist and mucous membranes are normal  No oropharyngeal exudate  Eyes: Conjunctivae are normal    Neck: Normal range of motion  Neck supple  No thyromegaly present  Cardiovascular: Normal rate, regular rhythm and normal heart sounds  Exam reveals no gallop and no friction rub  No murmur heard    Pulmonary/Chest: Effort normal and breath sounds normal  No stridor  No respiratory distress  He has no wheezes  He has no rales  Abdominal: Soft  Bowel sounds are normal  He exhibits no distension and no mass  There is no tenderness  There is no rebound and no guarding  Lymphadenopathy:     He has no cervical adenopathy  Neurological: He is alert and oriented to person, place, and time  Skin: He is not diaphoretic  Psychiatric: His speech is normal and behavior is normal  Judgment and thought content normal  His mood appears anxious  Nursing note and vitals reviewed

## 2019-02-26 NOTE — ASSESSMENT & PLAN NOTE
Mic Hahn is very stable on exam   He is to f/u in 3 - 4 weeks, or sooner PRN  Suspect here non-cardiac source for his palpitations / ?chest discomfort? (no reported chest pain) -> likely some GERD (his ongoing belching) v anxiety, or both  ECG today was overall reassuring  Pt can use OTC Zantac 150mg PO BID PRN, and also disccused continuing low intake of caffeinated products  Holter Monitor and Stress Echo, along with FBW ordered  Discussed his echo results from last year (no MVP; EF of 60%)

## 2019-02-26 NOTE — ASSESSMENT & PLAN NOTE
Suspecting more anxiety - related  Pt is to continue to limit caffeine intake  Checking TSH with other labs (FBW)  Holter Monitor and Stress Echo ordered  Pt is to continue to hydrate well / consistently  Pt is to f/u in 3 - 4 weeks, or sooner PRN

## 2019-03-03 DIAGNOSIS — E78.1 HYPERTRIGLYCERIDEMIA: Chronic | ICD-10-CM

## 2019-03-03 RX ORDER — FENOFIBRATE 134 MG/1
CAPSULE ORAL
Qty: 90 CAPSULE | Refills: 1 | Status: SHIPPED | OUTPATIENT
Start: 2019-03-03 | End: 2019-04-10 | Stop reason: SDUPTHER

## 2019-03-04 LAB
ALBUMIN SERPL-MCNC: 4.4 G/DL (ref 3.6–5.1)
ALBUMIN/GLOB SERPL: 2 (CALC) (ref 1–2.5)
ALP SERPL-CCNC: 68 U/L (ref 40–115)
ALT SERPL-CCNC: 63 U/L (ref 9–46)
AST SERPL-CCNC: 18 U/L (ref 10–35)
BASOPHILS # BLD AUTO: 28 CELLS/UL (ref 0–200)
BASOPHILS NFR BLD AUTO: 0.6 %
BILIRUB SERPL-MCNC: 0.5 MG/DL (ref 0.2–1.2)
BUN SERPL-MCNC: 22 MG/DL (ref 7–25)
BUN/CREAT SERPL: 17 (CALC) (ref 6–22)
CALCIUM SERPL-MCNC: 9.3 MG/DL (ref 8.6–10.3)
CHLORIDE SERPL-SCNC: 107 MMOL/L (ref 98–110)
CHOLEST SERPL-MCNC: 166 MG/DL
CHOLEST/HDLC SERPL: 4.7 (CALC)
CO2 SERPL-SCNC: 28 MMOL/L (ref 20–32)
CREAT SERPL-MCNC: 1.26 MG/DL (ref 0.7–1.25)
EOSINOPHIL # BLD AUTO: 110 CELLS/UL (ref 15–500)
EOSINOPHIL NFR BLD AUTO: 2.4 %
ERYTHROCYTE [DISTWIDTH] IN BLOOD BY AUTOMATED COUNT: 12.6 % (ref 11–15)
EST. AVERAGE GLUCOSE BLD GHB EST-MCNC: 114 (CALC)
EST. AVERAGE GLUCOSE BLD GHB EST-SCNC: 6.3 (CALC)
GLOBULIN SER CALC-MCNC: 2.2 G/DL (CALC) (ref 1.9–3.7)
GLUCOSE SERPL-MCNC: 115 MG/DL (ref 65–99)
HBA1C MFR BLD: 5.6 % OF TOTAL HGB
HCT VFR BLD AUTO: 41.3 % (ref 38.5–50)
HDLC SERPL-MCNC: 35 MG/DL
HGB BLD-MCNC: 14.2 G/DL (ref 13.2–17.1)
LDLC SERPL CALC-MCNC: 105 MG/DL (CALC)
LYMPHOCYTES # BLD AUTO: 1918 CELLS/UL (ref 850–3900)
LYMPHOCYTES NFR BLD AUTO: 41.7 %
MCH RBC QN AUTO: 32.5 PG (ref 27–33)
MCHC RBC AUTO-ENTMCNC: 34.4 G/DL (ref 32–36)
MCV RBC AUTO: 94.5 FL (ref 80–100)
MONOCYTES # BLD AUTO: 308 CELLS/UL (ref 200–950)
MONOCYTES NFR BLD AUTO: 6.7 %
NEUTROPHILS # BLD AUTO: 2236 CELLS/UL (ref 1500–7800)
NEUTROPHILS NFR BLD AUTO: 48.6 %
NONHDLC SERPL-MCNC: 131 MG/DL (CALC)
PLATELET # BLD AUTO: 219 THOUSAND/UL (ref 140–400)
PMV BLD REES-ECKER: 11.6 FL (ref 7.5–12.5)
POTASSIUM SERPL-SCNC: 4.1 MMOL/L (ref 3.5–5.3)
PROT SERPL-MCNC: 6.6 G/DL (ref 6.1–8.1)
RBC # BLD AUTO: 4.37 MILLION/UL (ref 4.2–5.8)
SL AMB EGFR AFRICAN AMERICAN: 70 ML/MIN/1.73M2
SL AMB EGFR NON AFRICAN AMERICAN: 60 ML/MIN/1.73M2
SODIUM SERPL-SCNC: 141 MMOL/L (ref 135–146)
TRIGL SERPL-MCNC: 147 MG/DL
TSH SERPL-ACNC: 2.86 MIU/L (ref 0.4–4.5)
WBC # BLD AUTO: 4.6 THOUSAND/UL (ref 3.8–10.8)

## 2019-03-14 ENCOUNTER — OFFICE VISIT (OUTPATIENT)
Dept: FAMILY MEDICINE CLINIC | Facility: CLINIC | Age: 64
End: 2019-03-14
Payer: COMMERCIAL

## 2019-03-14 VITALS
SYSTOLIC BLOOD PRESSURE: 168 MMHG | RESPIRATION RATE: 16 BRPM | OXYGEN SATURATION: 97 % | WEIGHT: 186.4 LBS | HEART RATE: 55 BPM | HEIGHT: 69 IN | BODY MASS INDEX: 27.61 KG/M2 | DIASTOLIC BLOOD PRESSURE: 100 MMHG | TEMPERATURE: 98 F

## 2019-03-14 DIAGNOSIS — I10 ESSENTIAL HYPERTENSION: Chronic | ICD-10-CM

## 2019-03-14 DIAGNOSIS — R07.89 ATYPICAL CHEST PAIN: Primary | ICD-10-CM

## 2019-03-14 DIAGNOSIS — R00.2 PALPITATIONS: ICD-10-CM

## 2019-03-14 DIAGNOSIS — F41.9 ANXIETY: Chronic | ICD-10-CM

## 2019-03-14 PROCEDURE — 1036F TOBACCO NON-USER: CPT | Performed by: FAMILY MEDICINE

## 2019-03-14 PROCEDURE — 99214 OFFICE O/P EST MOD 30 MIN: CPT | Performed by: FAMILY MEDICINE

## 2019-03-14 PROCEDURE — 3008F BODY MASS INDEX DOCD: CPT | Performed by: FAMILY MEDICINE

## 2019-03-14 RX ORDER — LISINOPRIL AND HYDROCHLOROTHIAZIDE 25; 20 MG/1; MG/1
1 TABLET ORAL DAILY
Qty: 30 TABLET | Refills: 5 | Status: SHIPPED | OUTPATIENT
Start: 2019-03-14 | End: 2019-04-08 | Stop reason: SDUPTHER

## 2019-03-14 NOTE — PROGRESS NOTES
Assessment/Plan:    No problem-specific Assessment & Plan notes found for this encounter  Diagnoses and all orders for this visit:    Atypical chest pain  Comments:  Stable at this time  Pt is set for Holter Monitor and Stress Echo next month  Palpitations  Comments:  As above  Essential hypertension  Comments: Will adjust up his BP med today, mixing now HCTZ with the Lisinopril  He is to continue a low salt diet, and light activity  Orders:  -     lisinopril-hydrochlorothiazide (PRINZIDE,ZESTORETIC) 20-25 MG per tablet; Take 1 tablet by mouth daily    Anxiety  Comments:  Ongoing, and adding to the above - he is continuing Celexa for now  Subjective:      Patient ID: Shakira George is a 61 y o  male  Severiano Finner is here in f/u with his wife  Has hx of YUAN in the past - will see Sleep Med again next month  Is scheduled for Holter Monitor and Stress Echo  He is stressed, and worried about his BP's -> "I have been feeling poorly, but a little better the last couple of days "  We discussed his most recent FBW  The following portions of the patient's history were reviewed and updated as appropriate: allergies, current medications, past family history, past social history, past surgical history and problem list     Past Medical History:   Diagnosis Date    Anxiety     Hypertension        Current Outpatient Medications:     citalopram (CeleXA) 40 mg tablet, Take 1 tablet (40 mg total) by mouth daily for 90 days, Disp: 90 tablet, Rfl: 3    fenofibrate micronized (LOFIBRA) 134 MG capsule, TAKE 1 CAPSULE BY MOUTH  DAILY WITH BREAKFAST, Disp: 90 capsule, Rfl: 1    lisinopril-hydrochlorothiazide (PRINZIDE,ZESTORETIC) 20-25 MG per tablet, Take 1 tablet by mouth daily, Disp: 30 tablet, Rfl: 5    No Known Allergies    Social History     Tobacco Use    Smoking status: Never Smoker    Smokeless tobacco: Never Used   Substance Use Topics    Alcohol use: Yes     Comment: 2 x monthly    Drug use:  No Review of Systems   Constitutional: Positive for fatigue  Negative for activity change and fever  Respiratory: Negative for shortness of breath  Cardiovascular: Negative for chest pain  Neurological: Negative for headaches  Psychiatric/Behavioral: The patient is nervous/anxious  Objective:      /100 (BP Location: Right arm, Patient Position: Sitting, Cuff Size: Standard)   Pulse 55   Temp 98 °F (36 7 °C) (Tympanic)   Resp 16   Ht 5' 9" (1 753 m)   Wt 84 6 kg (186 lb 6 4 oz)   SpO2 97%   BMI 27 53 kg/m²   156/92 RA seated  Physical Exam   Constitutional: He is oriented to person, place, and time  He appears well-developed and well-nourished  No distress  HENT:   Head: Normocephalic and atraumatic  Mouth/Throat: Oropharynx is clear and moist  No oropharyngeal exudate  Eyes: Conjunctivae are normal    Neck: Normal range of motion  Neck supple  No thyromegaly present  Cardiovascular: Normal rate, regular rhythm and normal heart sounds  Exam reveals no gallop and no friction rub  No murmur heard  Pulmonary/Chest: Effort normal and breath sounds normal  No stridor  No respiratory distress  He has no wheezes  He has no rales  Lymphadenopathy:     He has no cervical adenopathy  Neurological: He is alert and oriented to person, place, and time  Skin: He is not diaphoretic  Psychiatric: His speech is normal and behavior is normal  Judgment and thought content normal  His mood appears anxious

## 2019-04-08 DIAGNOSIS — I10 ESSENTIAL HYPERTENSION: Chronic | ICD-10-CM

## 2019-04-08 RX ORDER — LISINOPRIL AND HYDROCHLOROTHIAZIDE 25; 20 MG/1; MG/1
1 TABLET ORAL DAILY
Qty: 90 TABLET | Refills: 3 | Status: SHIPPED | OUTPATIENT
Start: 2019-04-08 | End: 2020-03-11

## 2019-04-10 DIAGNOSIS — E78.1 HYPERTRIGLYCERIDEMIA: Chronic | ICD-10-CM

## 2019-04-10 RX ORDER — FENOFIBRATE 134 MG/1
134 CAPSULE ORAL
Qty: 90 CAPSULE | Refills: 3 | Status: SHIPPED | OUTPATIENT
Start: 2019-04-10 | End: 2020-03-11

## 2019-04-15 ENCOUNTER — OFFICE VISIT (OUTPATIENT)
Dept: SLEEP CENTER | Facility: CLINIC | Age: 64
End: 2019-04-15
Payer: COMMERCIAL

## 2019-04-15 VITALS
SYSTOLIC BLOOD PRESSURE: 112 MMHG | HEIGHT: 69 IN | BODY MASS INDEX: 27.4 KG/M2 | WEIGHT: 185 LBS | DIASTOLIC BLOOD PRESSURE: 70 MMHG

## 2019-04-15 DIAGNOSIS — G47.33 OSA (OBSTRUCTIVE SLEEP APNEA): Primary | ICD-10-CM

## 2019-04-15 DIAGNOSIS — R53.83 FATIGUE, UNSPECIFIED TYPE: ICD-10-CM

## 2019-04-15 DIAGNOSIS — G47.61 PLMD (PERIODIC LIMB MOVEMENT DISORDER): ICD-10-CM

## 2019-04-15 DIAGNOSIS — F41.9 ANXIETY: ICD-10-CM

## 2019-04-15 DIAGNOSIS — I10 ESSENTIAL HYPERTENSION: ICD-10-CM

## 2019-04-15 DIAGNOSIS — E66.3 OVERWEIGHT (BMI 25.0-29.9): ICD-10-CM

## 2019-04-15 DIAGNOSIS — R00.2 PALPITATIONS: ICD-10-CM

## 2019-04-15 PROCEDURE — 99204 OFFICE O/P NEW MOD 45 MIN: CPT | Performed by: INTERNAL MEDICINE

## 2019-04-24 ENCOUNTER — HOSPITAL ENCOUNTER (OUTPATIENT)
Dept: NON INVASIVE DIAGNOSTICS | Facility: HOSPITAL | Age: 64
Discharge: HOME | End: 2019-04-24
Attending: FAMILY MEDICINE
Payer: COMMERCIAL

## 2019-04-24 ENCOUNTER — HOSPITAL ENCOUNTER (OUTPATIENT)
Dept: NON INVASIVE DIAGNOSTICS | Facility: CLINIC | Age: 64
Discharge: HOME | End: 2019-04-24
Payer: COMMERCIAL

## 2019-04-24 DIAGNOSIS — R00.2 PALPITATIONS: ICD-10-CM

## 2019-04-24 DIAGNOSIS — R07.89 ATYPICAL CHEST PAIN: ICD-10-CM

## 2019-04-24 PROCEDURE — 93350 STRESS TTE ONLY: CPT

## 2019-04-24 PROCEDURE — 93226 XTRNL ECG REC<48 HR SCAN A/R: CPT

## 2019-04-24 PROCEDURE — 93225 XTRNL ECG REC<48 HRS REC: CPT

## 2019-04-25 PROCEDURE — 93351 STRESS TTE COMPLETE: CPT | Performed by: INTERNAL MEDICINE

## 2019-04-26 LAB
CHEST PAIN STATEMENT: NORMAL
MAX DIASTOLIC BP: 84 MMHG
MAX HEART RATE: 146 BPM
MAX PREDICTED HEART RATE: 157 BPM
MAX. SYSTOLIC BP: 210 MMHG
PROTOCOL NAME: NORMAL
REASON FOR TERMINATION: NORMAL
TARGET HR FORMULA: NORMAL
TEST INDICATION: NORMAL
TIME IN EXERCISE PHASE: NORMAL

## 2019-04-30 PROCEDURE — 93227 XTRNL ECG REC<48 HR R&I: CPT | Performed by: INTERNAL MEDICINE

## 2019-05-20 ENCOUNTER — OFFICE VISIT (OUTPATIENT)
Dept: FAMILY MEDICINE CLINIC | Facility: CLINIC | Age: 64
End: 2019-05-20
Payer: COMMERCIAL

## 2019-05-20 VITALS
OXYGEN SATURATION: 97 % | DIASTOLIC BLOOD PRESSURE: 82 MMHG | HEART RATE: 54 BPM | BODY MASS INDEX: 27.46 KG/M2 | HEIGHT: 69 IN | RESPIRATION RATE: 16 BRPM | SYSTOLIC BLOOD PRESSURE: 136 MMHG | WEIGHT: 185.4 LBS

## 2019-05-20 DIAGNOSIS — F41.9 ANXIETY: Chronic | ICD-10-CM

## 2019-05-20 DIAGNOSIS — R73.01 IMPAIRED FASTING GLUCOSE: ICD-10-CM

## 2019-05-20 DIAGNOSIS — I10 ESSENTIAL HYPERTENSION: Chronic | ICD-10-CM

## 2019-05-20 DIAGNOSIS — R07.89 ATYPICAL CHEST PAIN: Primary | ICD-10-CM

## 2019-05-20 PROCEDURE — 3008F BODY MASS INDEX DOCD: CPT | Performed by: FAMILY MEDICINE

## 2019-05-20 PROCEDURE — 1036F TOBACCO NON-USER: CPT | Performed by: FAMILY MEDICINE

## 2019-05-20 PROCEDURE — 99213 OFFICE O/P EST LOW 20 MIN: CPT | Performed by: FAMILY MEDICINE

## 2019-05-20 PROCEDURE — 3079F DIAST BP 80-89 MM HG: CPT | Performed by: FAMILY MEDICINE

## 2019-05-20 PROCEDURE — 3075F SYST BP GE 130 - 139MM HG: CPT | Performed by: FAMILY MEDICINE

## 2019-05-28 ENCOUNTER — TELEPHONE (OUTPATIENT)
Dept: SLEEP CENTER | Facility: CLINIC | Age: 64
End: 2019-05-28

## 2019-10-14 ENCOUNTER — OFFICE VISIT (OUTPATIENT)
Dept: FAMILY MEDICINE CLINIC | Facility: CLINIC | Age: 64
End: 2019-10-14
Payer: COMMERCIAL

## 2019-10-14 VITALS
OXYGEN SATURATION: 97 % | SYSTOLIC BLOOD PRESSURE: 166 MMHG | HEART RATE: 58 BPM | RESPIRATION RATE: 16 BRPM | WEIGHT: 182.4 LBS | TEMPERATURE: 96.8 F | DIASTOLIC BLOOD PRESSURE: 90 MMHG | BODY MASS INDEX: 27.02 KG/M2 | HEIGHT: 69 IN

## 2019-10-14 DIAGNOSIS — Z12.5 SCREENING FOR PROSTATE CANCER: ICD-10-CM

## 2019-10-14 DIAGNOSIS — I10 ESSENTIAL HYPERTENSION: Chronic | ICD-10-CM

## 2019-10-14 DIAGNOSIS — E78.1 HYPERTRIGLYCERIDEMIA: Chronic | ICD-10-CM

## 2019-10-14 DIAGNOSIS — R73.01 IMPAIRED FASTING GLUCOSE: ICD-10-CM

## 2019-10-14 DIAGNOSIS — R07.89 ATYPICAL CHEST PAIN: Primary | ICD-10-CM

## 2019-10-14 PROCEDURE — 99214 OFFICE O/P EST MOD 30 MIN: CPT | Performed by: FAMILY MEDICINE

## 2019-10-14 PROCEDURE — 3008F BODY MASS INDEX DOCD: CPT | Performed by: FAMILY MEDICINE

## 2019-10-14 RX ORDER — AMLODIPINE BESYLATE 5 MG/1
5 TABLET ORAL DAILY
Qty: 30 TABLET | Refills: 5 | Status: SHIPPED | OUTPATIENT
Start: 2019-10-14 | End: 2019-11-04 | Stop reason: SDUPTHER

## 2019-11-04 DIAGNOSIS — I10 ESSENTIAL HYPERTENSION: Chronic | ICD-10-CM

## 2019-11-04 RX ORDER — AMLODIPINE BESYLATE 5 MG/1
5 TABLET ORAL DAILY
Qty: 90 TABLET | Refills: 3 | Status: SHIPPED | OUTPATIENT
Start: 2019-11-04 | End: 2020-03-30 | Stop reason: SDUPTHER

## 2019-11-19 DIAGNOSIS — F41.9 ANXIETY: ICD-10-CM

## 2019-11-19 RX ORDER — CITALOPRAM 40 MG/1
TABLET ORAL
Qty: 90 TABLET | Refills: 3 | Status: SHIPPED | OUTPATIENT
Start: 2019-11-19 | End: 2020-11-16

## 2020-01-20 ENCOUNTER — CLINICAL SUPPORT (OUTPATIENT)
Dept: FAMILY MEDICINE CLINIC | Facility: CLINIC | Age: 65
End: 2020-01-20
Payer: COMMERCIAL

## 2020-01-20 DIAGNOSIS — Z23 NEED FOR TDAP VACCINATION: Primary | ICD-10-CM

## 2020-01-20 PROCEDURE — 90715 TDAP VACCINE 7 YRS/> IM: CPT

## 2020-01-20 PROCEDURE — 90471 IMMUNIZATION ADMIN: CPT

## 2020-01-20 NOTE — PROGRESS NOTES
Assessment/Plan:    No problem-specific Assessment & Plan notes found for this encounter  Diagnoses and all orders for this visit:    Need for Tdap vaccination  -     TDAP VACCINE GREATER THAN OR EQUAL TO 6YO IM          Subjective:      Patient ID: aMriza Andres is a 59 y o  male  HPI        Review of Systems      Objective: There were no vitals taken for this visit           Physical Exam

## 2020-03-11 DIAGNOSIS — I10 ESSENTIAL HYPERTENSION: Chronic | ICD-10-CM

## 2020-03-11 DIAGNOSIS — E78.1 HYPERTRIGLYCERIDEMIA: Chronic | ICD-10-CM

## 2020-03-11 RX ORDER — FENOFIBRATE 134 MG/1
CAPSULE ORAL
Qty: 90 CAPSULE | Refills: 3 | Status: SHIPPED | OUTPATIENT
Start: 2020-03-11 | End: 2020-03-30 | Stop reason: SDUPTHER

## 2020-03-11 RX ORDER — LISINOPRIL AND HYDROCHLOROTHIAZIDE 25; 20 MG/1; MG/1
TABLET ORAL
Qty: 90 TABLET | Refills: 3 | Status: SHIPPED | OUTPATIENT
Start: 2020-03-11 | End: 2020-03-30 | Stop reason: SDUPTHER

## 2020-03-11 NOTE — TELEPHONE ENCOUNTER
Requested medication(s) are due for refill today: Yes  Patient has already received a courtesy refill: Yes  Other reason request has been forwarded to provider:    Per Tejaocol

## 2020-03-17 ENCOUNTER — OFFICE VISIT (OUTPATIENT)
Dept: FAMILY MEDICINE CLINIC | Facility: CLINIC | Age: 65
End: 2020-03-17
Payer: COMMERCIAL

## 2020-03-17 VITALS
HEART RATE: 56 BPM | BODY MASS INDEX: 28.24 KG/M2 | TEMPERATURE: 97.7 F | WEIGHT: 189.8 LBS | RESPIRATION RATE: 16 BRPM | DIASTOLIC BLOOD PRESSURE: 62 MMHG | OXYGEN SATURATION: 97 % | SYSTOLIC BLOOD PRESSURE: 134 MMHG

## 2020-03-17 DIAGNOSIS — K64.1 GRADE II HEMORRHOIDS: Primary | ICD-10-CM

## 2020-03-17 PROCEDURE — 3078F DIAST BP <80 MM HG: CPT | Performed by: FAMILY MEDICINE

## 2020-03-17 PROCEDURE — 1036F TOBACCO NON-USER: CPT | Performed by: FAMILY MEDICINE

## 2020-03-17 PROCEDURE — 99213 OFFICE O/P EST LOW 20 MIN: CPT | Performed by: FAMILY MEDICINE

## 2020-03-17 PROCEDURE — 3075F SYST BP GE 130 - 139MM HG: CPT | Performed by: FAMILY MEDICINE

## 2020-03-17 NOTE — PROGRESS NOTES
Assessment/Plan:    No problem-specific Assessment & Plan notes found for this encounter  Diagnoses and all orders for this visit:    Grade II hemorrhoids  Comments:  Pt stable - discussed options at length today: do nothing, reconsult for possible, discuss surgical options with [facility] (referral placed)  Orders:  -     Ambulatory referral to Colorectal Surgery; Future          Subjective:      Patient ID: Carla Palma is a 59 y o  male  Georgesidney Riosp has a remote hx of hemorrhoidal banding  Did well for awhile, but they are problematic again  Wants to discuss further options  Pt presents with his wife today  Pt declines exam in office today  ,        The following portions of the patient's history were reviewed and updated as appropriate: allergies, current medications, past family history, past social history, past surgical history and problem list     Past Medical History:   Diagnosis Date    Anxiety     Hypertension        Current Outpatient Medications:     amLODIPine (NORVASC) 5 mg tablet, Take 1 tablet (5 mg total) by mouth daily, Disp: 90 tablet, Rfl: 3    citalopram (CeleXA) 40 mg tablet, Take 1 tablet (40 mg total) by mouth daily for 90 days, Disp: 90 tablet, Rfl: 3    citalopram (CeleXA) 40 mg tablet, TAKE 1 TABLET BY MOUTH  DAILY, Disp: 90 tablet, Rfl: 3    fenofibrate micronized (LOFIBRA) 134 MG capsule, TAKE 1 CAPSULE BY MOUTH  DAILY WITH BREAKFAST, Disp: 90 capsule, Rfl: 3    lisinopril-hydrochlorothiazide (PRINZIDE,ZESTORETIC) 20-25 MG per tablet, TAKE 1 TABLET BY MOUTH  DAILY, Disp: 90 tablet, Rfl: 3    No Known Allergies    Social History     Tobacco Use    Smoking status: Never Smoker    Smokeless tobacco: Never Used   Substance Use Topics    Alcohol use: Yes     Comment: 2 x monthly    Drug use: No         Review of Systems   Constitutional: Negative for activity change  Gastrointestinal:        +Uncomfortable BMs, and occasional bright bleeding             Objective:      BP 134/62   Pulse 56   Temp 97 7 °F (36 5 °C)   Resp 16   Wt 86 1 kg (189 lb 12 8 oz)   SpO2 97%   BMI 28 24 kg/m²          Physical Exam   Constitutional: He is oriented to person, place, and time  He appears well-developed and well-nourished  No distress  HENT:   Head: Normocephalic and atraumatic  Neurological: He is alert and oriented to person, place, and time  Skin: He is not diaphoretic  Psychiatric: He has a normal mood and affect  His behavior is normal  Judgment and thought content normal    Nursing note and vitals reviewed

## 2020-03-30 DIAGNOSIS — I10 ESSENTIAL HYPERTENSION: Chronic | ICD-10-CM

## 2020-03-30 DIAGNOSIS — E78.1 HYPERTRIGLYCERIDEMIA: Chronic | ICD-10-CM

## 2020-03-30 RX ORDER — LISINOPRIL AND HYDROCHLOROTHIAZIDE 25; 20 MG/1; MG/1
1 TABLET ORAL DAILY
Qty: 90 TABLET | Refills: 3 | Status: SHIPPED | OUTPATIENT
Start: 2020-03-30

## 2020-03-30 RX ORDER — FENOFIBRATE 134 MG/1
134 CAPSULE ORAL
Qty: 90 CAPSULE | Refills: 3 | Status: SHIPPED | OUTPATIENT
Start: 2020-03-30

## 2020-03-30 RX ORDER — AMLODIPINE BESYLATE 5 MG/1
5 TABLET ORAL DAILY
Qty: 90 TABLET | Refills: 3 | Status: SHIPPED | OUTPATIENT
Start: 2020-03-30

## 2020-03-30 NOTE — TELEPHONE ENCOUNTER
MEDICATIONS ARE CORRECT  Medication:  Dosage:  How Often:  Quantity:    Last Office Visit: 3/17/20  Next Office Visit: 4/20/20  Last refilled:   How many pills left:  Pharmacy:   [id number] [place]   [address]  [facility] [address]  Phone: [phone] Fax: [phone]

## 2020-03-30 NOTE — TELEPHONE ENCOUNTER
Requested medication(s) are due for refill today: Yes  Patient has already received a courtesy refill: No  Other reason request has been forwarded to provider:      1 Mckay Tinsley LISINOPRIL - REFILLED 2 WEEKS AGO  PLEASE APPROVE AMLODIPINE

## 2020-04-28 ENCOUNTER — TELEPHONE (OUTPATIENT)
Dept: OTHER | Facility: OTHER | Age: 65
End: 2020-04-28

## 2020-05-12 PROBLEM — K64.1 GRADE II HEMORRHOIDS: Status: ACTIVE | Noted: 2020-05-12

## 2020-06-01 ENCOUNTER — TELEPHONE (OUTPATIENT)
Dept: FAMILY MEDICINE CLINIC | Facility: CLINIC | Age: 65
End: 2020-06-01

## 2020-06-16 ENCOUNTER — OFFICE VISIT (OUTPATIENT)
Dept: FAMILY MEDICINE CLINIC | Facility: CLINIC | Age: 65
End: 2020-06-16
Payer: COMMERCIAL

## 2020-06-16 VITALS
WEIGHT: 188 LBS | OXYGEN SATURATION: 98 % | RESPIRATION RATE: 16 BRPM | DIASTOLIC BLOOD PRESSURE: 68 MMHG | BODY MASS INDEX: 27.97 KG/M2 | SYSTOLIC BLOOD PRESSURE: 124 MMHG | HEART RATE: 65 BPM | TEMPERATURE: 97.9 F

## 2020-06-16 DIAGNOSIS — R73.01 IMPAIRED FASTING GLUCOSE: ICD-10-CM

## 2020-06-16 DIAGNOSIS — Z13.6 SCREENING FOR CARDIOVASCULAR CONDITION: ICD-10-CM

## 2020-06-16 DIAGNOSIS — Z13.1 SCREENING FOR DIABETES MELLITUS: ICD-10-CM

## 2020-06-16 DIAGNOSIS — Z12.5 SCREENING FOR PROSTATE CANCER: ICD-10-CM

## 2020-06-16 DIAGNOSIS — Z00.00 ANNUAL PHYSICAL EXAM: Primary | ICD-10-CM

## 2020-06-16 DIAGNOSIS — E78.1 HYPERTRIGLYCERIDEMIA: Chronic | ICD-10-CM

## 2020-06-16 DIAGNOSIS — I10 ESSENTIAL HYPERTENSION: Chronic | ICD-10-CM

## 2020-06-16 DIAGNOSIS — F41.9 ANXIETY: Chronic | ICD-10-CM

## 2020-06-16 PROCEDURE — 3078F DIAST BP <80 MM HG: CPT | Performed by: FAMILY MEDICINE

## 2020-06-16 PROCEDURE — 99396 PREV VISIT EST AGE 40-64: CPT | Performed by: FAMILY MEDICINE

## 2020-06-16 PROCEDURE — 3074F SYST BP LT 130 MM HG: CPT | Performed by: FAMILY MEDICINE

## 2020-08-18 LAB
ALBUMIN SERPL-MCNC: 4.6 G/DL (ref 3.6–5.1)
ALBUMIN/GLOB SERPL: 2.3 (CALC) (ref 1–2.5)
ALP SERPL-CCNC: 53 U/L (ref 35–144)
ALT SERPL-CCNC: 20 U/L (ref 9–46)
AST SERPL-CCNC: 15 U/L (ref 10–35)
BILIRUB SERPL-MCNC: 0.7 MG/DL (ref 0.2–1.2)
BUN SERPL-MCNC: 22 MG/DL (ref 7–25)
BUN/CREAT SERPL: NORMAL (CALC) (ref 6–22)
CALCIUM SERPL-MCNC: 9.4 MG/DL (ref 8.6–10.3)
CHLORIDE SERPL-SCNC: 103 MMOL/L (ref 98–110)
CHOLEST SERPL-MCNC: 176 MG/DL
CHOLEST/HDLC SERPL: 4.4 (CALC)
CO2 SERPL-SCNC: 29 MMOL/L (ref 20–32)
CREAT SERPL-MCNC: 1.08 MG/DL (ref 0.7–1.25)
EST. AVERAGE GLUCOSE BLD GHB EST-MCNC: 120 (CALC)
EST. AVERAGE GLUCOSE BLD GHB EST-SCNC: 6.6 (CALC)
GLOBULIN SER CALC-MCNC: 2 G/DL (CALC) (ref 1.9–3.7)
GLUCOSE SERPL-MCNC: 94 MG/DL (ref 65–99)
HBA1C MFR BLD: 5.8 % OF TOTAL HGB
HDLC SERPL-MCNC: 40 MG/DL
LDLC SERPL CALC-MCNC: 104 MG/DL (CALC)
NONHDLC SERPL-MCNC: 136 MG/DL (CALC)
POTASSIUM SERPL-SCNC: 3.7 MMOL/L (ref 3.5–5.3)
PROT SERPL-MCNC: 6.6 G/DL (ref 6.1–8.1)
PSA SERPL-MCNC: 0.2 NG/ML
SL AMB EGFR AFRICAN AMERICAN: 84 ML/MIN/1.73M2
SL AMB EGFR NON AFRICAN AMERICAN: 72 ML/MIN/1.73M2
SODIUM SERPL-SCNC: 140 MMOL/L (ref 135–146)
TRIGL SERPL-MCNC: 206 MG/DL

## 2020-11-15 DIAGNOSIS — F41.9 ANXIETY: ICD-10-CM

## 2020-11-16 RX ORDER — CITALOPRAM 40 MG/1
TABLET ORAL
Qty: 90 TABLET | Refills: 3 | Status: SHIPPED | OUTPATIENT
Start: 2020-11-16

## (undated) DEVICE — SYRINGE 50ML LL

## (undated) DEVICE — 1200CC GUARDIAN II: Brand: GUARDIAN

## (undated) DEVICE — SINGLE-USE BIOPSY FORCEPS: Brand: RADIAL JAW 4

## (undated) DEVICE — TUBING SUCTION 5MM X 12 FT

## (undated) DEVICE — SYRINGE 30ML LL